# Patient Record
Sex: MALE | Race: OTHER
[De-identification: names, ages, dates, MRNs, and addresses within clinical notes are randomized per-mention and may not be internally consistent; named-entity substitution may affect disease eponyms.]

---

## 2020-06-07 ENCOUNTER — HOSPITAL ENCOUNTER (EMERGENCY)
Dept: HOSPITAL 72 - EMR | Age: 20
Discharge: HOME | End: 2020-06-07
Payer: MEDICAID

## 2020-06-07 VITALS — BODY MASS INDEX: 27.44 KG/M2 | WEIGHT: 196 LBS | HEIGHT: 71 IN

## 2020-06-07 VITALS — DIASTOLIC BLOOD PRESSURE: 68 MMHG | SYSTOLIC BLOOD PRESSURE: 115 MMHG

## 2020-06-07 DIAGNOSIS — S92.902A: Primary | ICD-10-CM
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
## 2020-06-07 PROCEDURE — 73610 X-RAY EXAM OF ANKLE: CPT

## 2020-06-07 PROCEDURE — 99284 EMERGENCY DEPT VISIT MOD MDM: CPT

## 2020-06-07 PROCEDURE — 73630 X-RAY EXAM OF FOOT: CPT

## 2020-06-07 NOTE — EMERGENCY ROOM REPORT
History of Present Illness


General


Chief Complaint:  Lower Extremity Injury


Source:  Patient





Present Illness


HPI


 20 YO male presents to the ED C/o 7/10 in severity Left foot and ankle pain s/

p landing wrong after jumping over 1 week ago. Pt. reports no improvement in 

his symptoms. He denies midline back pain.  Denies numbness tingling or loss of 

sensation or gross motor movements of the extremities, incontinence of bowel or 

bladder. Denies CP, Palpitations, LOC, AMS, dizziness, Changes in Vision, 

weakness or a sudden severe headache.  Denies bruising. He reports some 

swelling. He reports minimal relief with rest.  Pt. reports pain increases with 

walking.


Allergies:  


Coded Allergies:  


     No Known Allergies (Unverified , 6/7/20)





COVID-19 Screening


Contact w/high risk pt:  No


Recent Travel to affected area:  No


Experienced COVID-19 symptoms?:  No


COVID-19 Testing performed PTA:  No





Patient History


Past Medical History:  see triage record


Past Surgical History:  none


Pertinent Family History:  none


Reviewed Nursing Documentation:  PMH: Agreed; PSxH: Agreed





Nursing Documentation-PMH


Past Medical History:  No Stated History





Review of Systems


All Other Systems:  negative except mentioned in HPI





Physical Exam





Vital Signs








  Date Time  Temp Pulse Resp B/P (MAP) Pulse Ox O2 Delivery O2 Flow Rate FiO2


 


6/7/20 13:56 97.9 71 20 119/66 (83) 98 Room Air  








Sp02 EP Interpretation:  reviewed, normal


General Appearance:  no apparent distress, alert, GCS 15, non-toxic


Head:  normocephalic, atraumatic


Eyes:  bilateral eye normal inspection, bilateral eye PERRL


ENT:  hearing grossly normal, normal voice


Neck:  full range of motion


Respiratory:  chest non-tender, lungs clear, normal breath sounds, speaking 

full sentences


Cardiovascular #1:  regular rate, rhythm, normal capillary refill


Cardiovascular #2:  2+ dorsalis pedis (R), 2+ dorsalis pedis (L)


Musculoskeletal:  back normal, tender - Left ankle and lateral foot., swelling 

- Left ankle and lateral foot.


Neurologic:  alert, motor strength/tone normal, oriented x3, sensory intact, 

responsive, speech normal


Psychiatric:  judgement/insight normal


Skin:  no rash, normal color





Medical Decision Making


PA Attestation


Dr. Sutton is my supervising Physician whom patient management has been 

discussed with.


Diagnostic Impression:  


 Primary Impression:  


 Foot fracture, left


 Qualified Codes:  S92.902A - Unspecified fracture of left foot, initial 

encounter for closed fracture


ER Course


 20 YO male presents to the ED C/o 7/10 in severity Left foot and ankle pain s/

p landing wrong after jumping over 1 week ago. Pt. reports no improvement in 

his symptoms. He denies midline back pain.  Denies numbness tingling or loss of 

sensation or gross motor movements of the extremities, incontinence of bowel or 

bladder. Denies CP, Palpitations, LOC, AMS, dizziness, Changes in Vision, 

weakness or a sudden severe headache.  Denies bruising. He reports some 

swelling. He reports minimal relief with rest.  Pt. reports pain increases with 

walking. 





Ddx considered but are not limited to Fracture, dislocation, contusion,  Sprain/

Strain/Spasm.





Vital signs: are WNL, pt. is afebrile


H&PE are most consistent with musculoskeletal injury will perform imaging to r/

o fractures/dislocations. 





ORDERS:





-  X-ray Left Foot & Ankle -Left ankle ST swelling, Left 5th metatarsal fx. 





ED INTERVENTIONS:





-Norco PO


- Pt. placed into Left walking shoe splint applied  by ED tech. Pt. remains 

neurovascularly intact.


- Patient is provided with crutches and instructed on their use








DISCHARGE: At this time pt. is stable for d/c to home. Will provide printed 

patient care instructions, and any necessary prescriptions. Care plan and 

follow up instructions have been discussed with the patient prior to discharge.


Other X-Ray Diagnostic Results


Other X-Ray Diagnostic Results #1:  


   X-Ray ordered:  Left Foot


   # of Views/Limited Vs Complete:  3 View


   Indication:  Pain


   EP Interpretation:  Yes


   PA Xray:  Interpretation reviewed, by supervising MD, and agrees with 

findings.


   Interpretation:  no dislocation, no soft tissue swelling, no fractures, 

nonspecific bowel gas


   Impression:  No acute disease


   Electronically Signed by:  Mari Espinoza PA-C


Other X-Ray Diagnostic Results #2:  


   X-Ray ordered:  Left Ankle


   # of Views/Limited Vs Complete:  3 View


   Indication:  Pain


   EP Interpretation:  Yes


   PA Xray:  Interpretation reviewed, by supervising MD, and agrees with 

findings.


   Interpretation:  no dislocation, no soft tissue swelling, other - 5th 

metatarsal fx.


   Impression:  Other - abnormal


   Electronically Signed by:  Mari Espinoza PA-C





Last Vital Signs








  Date Time  Temp Pulse Resp B/P (MAP) Pulse Ox O2 Delivery O2 Flow Rate FiO2


 


6/7/20 13:56 97.9 71 20 119/66 (83) 98 Room Air  








Status:  improved


Disposition:  HOME, SELF-CARE


Condition:  Stable


Scripts


Ibuprofen* (MOTRIN*) 600 Mg Tablet


600 MG ORAL THREE TIMES A DAY, #20 TAB


   Prov: Mari Espinoza         6/7/20 


Hydrocodone Bit/Acetaminophen 5-325* (NORCO 5-325 TABLET*) 1 Each Tablet


1 TAB ORAL Q6H PRN for FOR PAIN, #12 TAB 0 Refills


   Prov: Mari Espinoza         6/7/20


Referrals:  


Orthopedic Urgent Care


Patient Instructions:  Metatarsal Fracture





Additional Instructions:  


Take medications as directed.   Do not drink alcohol, drive, or operate heavy 

machinery while taking Norco as this may cause drowsiness. 











 ** Follow up with an ORTHOPEDIC SPECIALIST in 3-5 days, even if your symptoms 

have resolved. ** 





If symptoms persist, additional imaging and/or treatments may be required at 

the discretion of your PCP or Ortho Specialist.  ** 





--Please review list of primary care clinics, if you do not already have a 

primary care provider who can give you an Orthopedic Referral. 





Return sooner to ED if new symptoms occur, or current symptoms become worse. 








- Please note that this Emergency Department Report was dictated using [employer] technology software, occasionally this can lead to 

erroneous entry secondary to interpretation by the dictation equipment.











Mari Espinoza Jun 7, 2020 14:49

## 2020-06-07 NOTE — NUR
ED Nurse Note:



Patient walked into ED from home c/o left foot pain x 1 week. Per pt, he was 
jumping and twisted his ankle when he landed on the floor. Pt is able to walk 
with steady gait.

## 2020-06-07 NOTE — DIAGNOSTIC IMAGING REPORT
EXAM:

  XR Left Foot Complete, 3 or More Views

 

CLINICAL HISTORY:

  PAIN

 

TECHNIQUE:

  Frontal, lateral and oblique views of the left foot.

 

COMPARISON:

  None

 

FINDINGS:

  Bones/joints:  Nondisplaced fractures of the proximal one third of the 

left fifth metatarsal.  No dislocation.

  Soft tissues:  Mild soft tissue swelling.  No radiopaque foreign body.

 

IMPRESSION:     

  Nondisplaced fractures of the proximal one third of the left fifth 

metatarsal.

## 2020-06-07 NOTE — NUR
ED Nurse Note:



Pt cleared by ERPA for discharge.  DC instructions/prescription was given and 
explained to pt and verbalized understanding of teachings. All medical deviecs 
such as ID band  removed. Pt is AAO x4, ambulatory and left with all personal 
belongings. P/U by his parents.

## 2020-06-07 NOTE — DIAGNOSTIC IMAGING REPORT
EXAM:

  XR Left Ankle Complete, 3 or More Views

 

CLINICAL HISTORY:

  PAIN

 

TECHNIQUE:

  Frontal, lateral and oblique views of the left ankle.

 

COMPARISON:

  None

 

FINDINGS:

  Bones/joints: Fractures of the proximal left fifth metatarsal.  No 

other acute fracture or dislocation.  Ankle mortise is intact.

 

  Soft tissues: Normal.

 

IMPRESSION:   

  Fractures of the proximal left fifth metatarsal.